# Patient Record
Sex: MALE | Race: WHITE | NOT HISPANIC OR LATINO | Employment: FULL TIME | URBAN - METROPOLITAN AREA
[De-identification: names, ages, dates, MRNs, and addresses within clinical notes are randomized per-mention and may not be internally consistent; named-entity substitution may affect disease eponyms.]

---

## 2017-06-27 ENCOUNTER — OFFICE VISIT (OUTPATIENT)
Dept: URGENT CARE | Facility: PHYSICIAN GROUP | Age: 62
End: 2017-06-27
Payer: COMMERCIAL

## 2017-06-27 VITALS
BODY MASS INDEX: 21.42 KG/M2 | WEIGHT: 153 LBS | HEART RATE: 82 BPM | SYSTOLIC BLOOD PRESSURE: 122 MMHG | OXYGEN SATURATION: 98 % | HEIGHT: 71 IN | RESPIRATION RATE: 12 BRPM | TEMPERATURE: 98.3 F | DIASTOLIC BLOOD PRESSURE: 74 MMHG

## 2017-06-27 PROCEDURE — 99203 OFFICE O/P NEW LOW 30 MIN: CPT | Performed by: PHYSICIAN ASSISTANT

## 2017-06-27 ASSESSMENT — ENCOUNTER SYMPTOMS
HEADACHES: 0
CONSTITUTIONAL NEGATIVE: 1
NAUSEA: 0
MYALGIAS: 0
VOMITING: 0
ROS SKIN COMMENTS: SEE HPI
NEUROLOGICAL NEGATIVE: 1
BRUISES/BLEEDS EASILY: 0
RESPIRATORY NEGATIVE: 1
CARDIOVASCULAR NEGATIVE: 1

## 2017-06-27 ASSESSMENT — PAIN SCALES - GENERAL: PAINLEVEL: 1=MINIMAL PAIN

## 2017-06-27 NOTE — MR AVS SNAPSHOT
"        Junior Bran   2017 9:10 AM   Office Visit   MRN: [id number]    Department:  [facility]   Dept Phone:  [phone]    Description:  Male : 1955   Provider:  Cinthya Titus PA-C           Reason for Visit     Insect Bite x 3 days / RT leg      Allergies as of 2017     No Known Allergies      You were diagnosed with     Hymenoptera sting, accidental or unintentional, initial encounter   [[id number]]         Vital Signs     Blood Pressure Pulse Temperature Respirations Height Weight    122/74 mmHg 82 36.8 °C (98.3 °F) 12 1.803 m (5' 10.98\") 69.4 kg (153 lb)    Body Mass Index Oxygen Saturation                21.35 kg/m2 98%          Basic Information     Date Of Birth Sex Race Ethnicity Preferred Language    1955 Male White Non- English      Health Maintenance        Date Due Completion Dates    IMM DTaP/Tdap/Td Vaccine (1 - Tdap) 1/3/1974 ---    COLONOSCOPY 1/3/2005 ---    IMM ZOSTER VACCINE 1/3/2015 ---            Current Immunizations     No immunizations on file.      Below and/or attached are the medications your provider expects you to take. Review all of your home medications and newly ordered medications with your provider and/or pharmacist. Follow medication instructions as directed by your provider and/or pharmacist. Please keep your medication list with you and share with your provider. Update the information when medications are discontinued, doses are changed, or new medications (including over-the-counter products) are added; and carry medication information at all times in the event of emergency situations     Allergies:  No Known Allergies          Medications  Valid as of: 2017 -  9:38 AM    Generic Name Brand Name Tablet Size Instructions for use    Hydrocodone-Acetaminophen (Tab) NORCO 5-325 MG Take 1-2 Tabs by mouth every 6 hours as needed.        Hydrocortisone Acetate (Suppos) ANUSOL-HC 25 MG Insert 1 Suppository in rectum every 12 hours.       "    .                 Medicines prescribed today were sent to:     [id number] - [place], [place] - [address]    [address] [place] [address]    Phone: [phone] Fax: [phone]    Open 24 Hours?: No    [id number] - [place], [place] - [address]    [address] [place] [address]    Phone: [phone] Fax: [phone]    Open 24 Hours?: No      Medication refill instructions:       If your prescription bottle indicates you have medication refills left, it is not necessary to call your provider’s office. Please contact your pharmacy and they will refill your medication.    If your prescription bottle indicates you do not have any refills left, you may request refills at any time through one of the following ways: The online eSellerPro system (except Urgent Care), by calling your provider’s office, or by asking your pharmacy to contact your provider’s office with a refill request. Medication refills are processed only during regular business hours and may not be available until the next business day. Your provider may request additional information or to have a follow-up visit with you prior to refilling your medication.   *Please Note: Medication refills are assigned a new Rx number when refilled electronically. Your pharmacy may indicate that no refills were authorized even though a new prescription for the same medication is available at the pharmacy. Please request the medicine by name with the pharmacy before contacting your provider for a refill.        Other Notes About Your Plan     Colonoscopy with polyp. Bx report not known. Told to repeat 10 years.           eSellerPro Access Code: [email or web address]  Expires: 7/27/2017  9:38 AM    eSellerPro  A secure, online tool to manage your health information     [employer]’s eSellerPro® is a secure, online tool that connects you to your personalized health information from the privacy of your home -- day or night - making it very easy for you to manage your  healthcare. Once the activation process is completed, you can even access your medical information using the Track lenore, which is available for free in the Apple Lenore store or Google Play store.     Track provides the following levels of access (as shown below):   My Chart Features   [facility] Doctor [facility]  Specialists [facility]  Urgent  Care [facility]  Primary Care  Doctor   Email your healthcare team securely and privately 24/7 X X X    Manage appointments: schedule your next appointment; view details of past/upcoming appointments X      Request prescription refills. X      View recent personal medical records, including lab and immunizations X X X X   View health record, including health history, allergies, medications X X X X   Read reports about your outpatient visits, procedures, consult and ER notes X X X X   See your discharge summary, which is a recap of your hospital and/or ER visit that includes your diagnosis, lab results, and care plan. X X       How to register for Track:  1. Go to  [email or web address].  2. Click on the Sign Up Now box, which takes you to the New Member Sign Up page. You will need to provide the following information:  a. Enter your Track Access Code exactly as it appears at the top of this page. (You will not need to use this code after you’ve completed the sign-up process. If you do not sign up before the expiration date, you must request a new code.)   b. Enter your date of birth.   c. Enter your home email address.   d. Click Submit, and follow the next screen’s instructions.  3. Create a Track ID. This will be your Track login ID and cannot be changed, so think of one that is secure and easy to remember.  4. Create a Track password. You can change your password at any time.  5. Enter your Password Reset Question and Answer. This can be used at a later time if you forget your password.   6. Enter your e-mail address. This allows you to receive e-mail  notifications when new information is available in [employer].  7. Click Sign Up. You can now view your health information.    For assistance activating your [employer] account, call [phone]

## 2017-06-27 NOTE — PROGRESS NOTES
"Subjective:      Junior Bran is a 62 y.o. male who presents with Insect Bite        HPI  Patient presents today for 3 days of suspected insect bite/stings to his right lower leg.   He has a few on his front of his leg and blistering one on his posterior calf.   He states that overall the pain is better as is the itching but the rashes have been slower to improve.  The blister started draining and has had clear fluid.  Wife was concerned for infection and he going out of town on Saturday.   He has not had any fevers, chills or arthralgias.  No SOB, nausea or vomiting.    He has not attempted any interventions.     Review of Systems   Constitutional: Negative.    Respiratory: Negative.    Cardiovascular: Negative.    Gastrointestinal: Negative for nausea and vomiting.   Musculoskeletal: Negative for myalgias and joint pain.   Skin:        SEE HPI   Neurological: Negative.  Negative for headaches.   Endo/Heme/Allergies: Negative for environmental allergies. Does not bruise/bleed easily.       PMH:  has no past medical history on file.  MEDS:   Current outpatient prescriptions:   •  hydrocodone-acetaminophen (NORCO) 5-325 MG TABS per tablet, Take 1-2 Tabs by mouth every 6 hours as needed., Disp: 20 Each, Rfl: 0  •  hydrocortisone (ANUSOL-HC) 25 MG SUPP, Insert 1 Suppository in rectum every 12 hours., Disp: 20 Suppository, Rfl: 0  ALLERGIES: No Known Allergies  SURGHX:   Past Surgical History   Procedure Laterality Date   • Colonoscopy with biopsy       repeat 10 years     SOCHX:  reports that he has never smoked. He does not have any smokeless tobacco history on file. He reports that he drinks about 3.5 oz of alcohol per week.  FH: Family history was reviewed, no pertinent findings to report     Objective:     /74 mmHg  Pulse 82  Temp(Src) 36.8 °C (98.3 °F)  Resp 12  Ht 1.803 m (5' 10.98\")  Wt 69.4 kg (153 lb)  BMI 21.35 kg/m2  SpO2 98%     Physical Exam   Constitutional: He is oriented to person, place, " and time. He appears well-developed and well-nourished. No distress.   Eyes: Conjunctivae and EOM are normal. Pupils are equal, round, and reactive to light.   Neck: Normal range of motion. Neck supple.   Cardiovascular: Normal rate and regular rhythm.    Pulmonary/Chest: Effort normal and breath sounds normal.   Lymphadenopathy:     He has no cervical adenopathy.   Neurological: He is alert and oriented to person, place, and time.   Skin: Skin is warm and dry.        Psychiatric: He has a normal mood and affect. His behavior is normal.   Vitals reviewed.          Assessment/Plan:     1. Hymenoptera sting, accidental or unintentional, initial encounter    -discussed with patient most hymenoptera bites/stings are aseptic and secondary infection usually progresses from scratching and self inoculation.  Patient admits he really has not been itching.  Localized allergic dermatitis dx today.   -he feels well and feels that overall improving but wanted to make sure no infection.   -discussed the signs and symptoms of this.   -recommend Benadryl and offered rx topical corticosteroid that he declines.   -he will RTC immediately at any worsening condition or signs of infection.       Supportive care, differential diagnoses, and indications for immediate follow-up discussed with patient.   Pathogenesis of diagnosis discussed including typical length and natural progression.   Instructed to return to clinic or nearest emergency department for any change in condition, further concerns, or worsening of symptoms.  Patient states understanding of the plan of care and discharge instructions.        Cinthya Titus PA-C

## 2017-09-03 ENCOUNTER — HOSPITAL ENCOUNTER (EMERGENCY)
Facility: MEDICAL CENTER | Age: 62
End: 2017-09-03
Payer: COMMERCIAL

## 2017-09-03 VITALS
RESPIRATION RATE: 18 BRPM | BODY MASS INDEX: 20.56 KG/M2 | HEIGHT: 71 IN | SYSTOLIC BLOOD PRESSURE: 109 MMHG | TEMPERATURE: 98 F | DIASTOLIC BLOOD PRESSURE: 68 MMHG | WEIGHT: 146.83 LBS | HEART RATE: 104 BPM | OXYGEN SATURATION: 98 %

## 2017-09-03 LAB — EKG IMPRESSION: NORMAL

## 2017-09-03 PROCEDURE — 302449 STATCHG TRIAGE ONLY (STATISTIC)

## 2017-09-03 PROCEDURE — 93005 ELECTROCARDIOGRAM TRACING: CPT

## 2017-09-03 ASSESSMENT — PAIN SCALES - GENERAL: PAINLEVEL_OUTOF10: 2

## 2017-09-03 NOTE — ED NOTES
"Chief Complaint   Patient presents with   • Epigastric Pain     Symptoms started approx 10pm yesterday. Reports pain as sharp and constant initially. Now pt states his pain has decreased, is intermittent, and is worse with inspiration.     /68   Pulse (!) 104   Temp 36.7 °C (98 °F)   Resp 18   Ht 1.803 m (5' 11\")   Wt 66.6 kg (146 lb 13.2 oz)   SpO2 98%   BMI 20.48 kg/m²     Pt ambulated into triage, complaints as above. Denies cardiac hx. EKG completed. VS as above, NAD, encouraged to return to the triage nurse or tech with any new complaints or symptoms.  "

## 2017-09-15 ENCOUNTER — OFFICE VISIT (OUTPATIENT)
Dept: URGENT CARE | Facility: PHYSICIAN GROUP | Age: 62
End: 2017-09-15
Payer: COMMERCIAL

## 2017-09-15 VITALS
DIASTOLIC BLOOD PRESSURE: 72 MMHG | BODY MASS INDEX: 20.58 KG/M2 | SYSTOLIC BLOOD PRESSURE: 116 MMHG | HEART RATE: 60 BPM | WEIGHT: 147 LBS | TEMPERATURE: 97.7 F | HEIGHT: 71 IN | OXYGEN SATURATION: 96 %

## 2017-09-15 DIAGNOSIS — J01.00 ACUTE NON-RECURRENT MAXILLARY SINUSITIS: ICD-10-CM

## 2017-09-15 PROCEDURE — 99214 OFFICE O/P EST MOD 30 MIN: CPT | Performed by: PHYSICIAN ASSISTANT

## 2017-09-15 RX ORDER — AMOXICILLIN AND CLAVULANATE POTASSIUM 875; 125 MG/1; MG/1
1 TABLET, FILM COATED ORAL 2 TIMES DAILY
Qty: 20 TAB | Refills: 0 | Status: SHIPPED | OUTPATIENT
Start: 2017-09-15 | End: 2018-09-28

## 2017-09-15 NOTE — PROGRESS NOTES
"Chief Complaint   Patient presents with   • Headache     Sinus headache, pressure and pain x 3 wks. Comes and goes        HISTORY OF PRESENT ILLNESS: Patient is a 62 y.o. male who presents today for 3 weeks of waxing and waning sinus congestion and pressure.    He has been going back and forth to [place] and cleaned out a house, may have come in contact with dust.  He has had runny nose, nasal congestion.   He states that his sinus pressure is on both sides of his face.  He has been using Claritin and Flonase intermittently but not consistently.  Will make up with throbbing in sinuses in the morning/recumbant position.     There are no active problems to display for this patient.      Allergies:Review of patient's allergies indicates no known allergies.    No current [facility]-ordered outpatient prescriptions on file.     No current [facility]-ordered facility-administered medications on file.        No past medical history on file.    Social History   Substance Use Topics   • Smoking status: Never Smoker   • Smokeless tobacco: Never Used   • Alcohol use 3.5 oz/week     7 Glasses of wine per week       No family status information on file.   No family history on file. No pertinent FH    ROS:  Review of Systems   Constitutional: SEE HPI  HENT: SEE HPI  Eyes: Negative for blurred vision.   Respiratory: SEE HPI  Cardiovascular: Negative for chest pain, palpitations, orthopnea and leg swelling.   Gastrointestinal: Negative for heartburn, nausea, vomiting and abdominal pain.   All other systems reviewed and are negative.       Exam:  Blood pressure 116/72, pulse 60, temperature 36.5 °C (97.7 °F), height 1.803 m (5' 11\"), weight 66.7 kg (147 lb), SpO2 96 %.  General:  Well nourished, well developed male in NAD  Eyes: PERRLA, EOM within normal limits, no conjunctival injection, no scleral icterus, visual fields and acuity grossly intact.  Ears: Normal shape and symmetry, no tenderness, no discharge. External canals are without any " significant edema or erythema. Tympanic membranes are without any inflammation, no effusion. Gross auditory acuity is intact  Nose: Symmetrical, sinuses without tenderness, no discharge/clear rhinorrhea.    Mouth: reasonable hygiene, no erythema exudates or tonsillar enlargement.  Neck: no masses, range of motion within normal limits, no tracheal deviation. No lymphadenopathy  Pulmonary: Normal respiratory effort, no wheezes, crackles, or rhonchi.  Cardiovascular: regular rate and rhythm without murmurs, rubs, or gallops.  Skin: No visible rashes or lesion. Warm, pink, dry.   Extremities: no clubbing, cyanosis, or edema.  Neuro: A&O x 3. Speech normal/clear.  Normal gait.         Assessment/Plan:  1. Acute non-recurrent maxillary sinusitis  amoxicillin-clavulanate (AUGMENTIN) 875-125 MG Tab          -consistent with acute sinusitis.   Sinus care at home discussed  -fluids, rest emphasized.  Flonase/Allegra or similar for post nasal drainage trial first, consistently  -humidifier/steam inhalations to soothe lungs.   -rx as above. Contingent antibiotic prescription given to patient to fill upon meeting criteria of guidelines discussed.       Supportive care, differential diagnoses, and indications for immediate follow-up discussed with patient.   Pathogenesis of diagnosis discussed including typical length and natural progression.   Instructed to return to clinic or nearest emergency department for any change in condition, further concerns, or worsening of symptoms.  Patient states understanding of the plan of care and discharge instructions.      Cinthya Titus P.A.-C.

## 2018-06-13 DIAGNOSIS — Z00.00 WELL ADULT EXAM: ICD-10-CM

## 2018-09-14 ENCOUNTER — HOSPITAL ENCOUNTER (OUTPATIENT)
Dept: RADIOLOGY | Facility: MEDICAL CENTER | Age: 63
End: 2018-09-14
Attending: FAMILY MEDICINE
Payer: COMMERCIAL

## 2018-09-14 ENCOUNTER — NON-PROVIDER VISIT (OUTPATIENT)
Dept: OTHER | Facility: MEDICAL CENTER | Age: 63
End: 2018-09-14

## 2018-09-14 ENCOUNTER — HOSPITAL ENCOUNTER (OUTPATIENT)
Facility: MEDICAL CENTER | Age: 63
End: 2018-09-14
Attending: FAMILY MEDICINE
Payer: COMMERCIAL

## 2018-09-14 DIAGNOSIS — Z00.00 PHYSICAL EXAM, ROUTINE: ICD-10-CM

## 2018-09-14 DIAGNOSIS — Z00.00 WELL ADULT EXAM: ICD-10-CM

## 2018-09-14 LAB
ALBUMIN SERPL BCP-MCNC: 4.6 G/DL (ref 3.2–4.9)
ALBUMIN/GLOB SERPL: 1.5 G/DL
ALP SERPL-CCNC: 62 U/L (ref 30–99)
ALT SERPL-CCNC: 19 U/L (ref 2–50)
ANION GAP SERPL CALC-SCNC: 8 MMOL/L (ref 0–11.9)
APPEARANCE UR: CLEAR
AST SERPL-CCNC: 21 U/L (ref 12–45)
BASOPHILS # BLD AUTO: 0.8 % (ref 0–1.8)
BASOPHILS # BLD: 0.04 K/UL (ref 0–0.12)
BILIRUB SERPL-MCNC: 0.7 MG/DL (ref 0.1–1.5)
BILIRUB UR QL STRIP.AUTO: NEGATIVE
BUN SERPL-MCNC: 15 MG/DL (ref 8–22)
CALCIUM SERPL-MCNC: 8.8 MG/DL (ref 8.5–10.5)
CHLORIDE SERPL-SCNC: 103 MMOL/L (ref 96–112)
CO2 SERPL-SCNC: 26 MMOL/L (ref 20–33)
COLOR UR: YELLOW
CREAT SERPL-MCNC: 0.79 MG/DL (ref 0.5–1.4)
CREAT UR-MCNC: 190.3 MG/DL
CRP SERPL HS-MCNC: 0.8 MG/L (ref 0–7.5)
EOSINOPHIL # BLD AUTO: 0.22 K/UL (ref 0–0.51)
EOSINOPHIL NFR BLD: 4.3 % (ref 0–6.9)
ERYTHROCYTE [DISTWIDTH] IN BLOOD BY AUTOMATED COUNT: 42.3 FL (ref 35.9–50)
EST. AVERAGE GLUCOSE BLD GHB EST-MCNC: 111 MG/DL
GLOBULIN SER CALC-MCNC: 3 G/DL (ref 1.9–3.5)
GLUCOSE SERPL-MCNC: 95 MG/DL (ref 65–99)
GLUCOSE UR STRIP.AUTO-MCNC: NEGATIVE MG/DL
HBA1C MFR BLD: 5.5 % (ref 0–5.6)
HCT VFR BLD AUTO: 45.5 % (ref 42–52)
HGB BLD-MCNC: 15 G/DL (ref 14–18)
IMM GRANULOCYTES # BLD AUTO: 0.02 K/UL (ref 0–0.11)
IMM GRANULOCYTES NFR BLD AUTO: 0.4 % (ref 0–0.9)
KETONES UR STRIP.AUTO-MCNC: 15 MG/DL
LEUKOCYTE ESTERASE UR QL STRIP.AUTO: NEGATIVE
LYMPHOCYTES # BLD AUTO: 1.85 K/UL (ref 1–4.8)
LYMPHOCYTES NFR BLD: 36.5 % (ref 22–41)
MCH RBC QN AUTO: 30.3 PG (ref 27–33)
MCHC RBC AUTO-ENTMCNC: 33 G/DL (ref 33.7–35.3)
MCV RBC AUTO: 91.9 FL (ref 81.4–97.8)
MICRO URNS: ABNORMAL
MICROALBUMIN UR-MCNC: 1.2 MG/DL
MICROALBUMIN/CREAT UR: 6 MG/G (ref 0–30)
MONOCYTES # BLD AUTO: 0.41 K/UL (ref 0–0.85)
MONOCYTES NFR BLD AUTO: 8.1 % (ref 0–13.4)
NEUTROPHILS # BLD AUTO: 2.53 K/UL (ref 1.82–7.42)
NEUTROPHILS NFR BLD: 49.9 % (ref 44–72)
NITRITE UR QL STRIP.AUTO: NEGATIVE
NRBC # BLD AUTO: 0 K/UL
NRBC BLD-RTO: 0 /100 WBC
PH UR STRIP.AUTO: 5.5 [PH]
PLATELET # BLD AUTO: 321 K/UL (ref 164–446)
PMV BLD AUTO: 10.6 FL (ref 9–12.9)
POTASSIUM SERPL-SCNC: 3.9 MMOL/L (ref 3.6–5.5)
PROT SERPL-MCNC: 7.6 G/DL (ref 6–8.2)
PROT UR QL STRIP: NEGATIVE MG/DL
PSA SERPL-MCNC: 2.48 NG/ML (ref 0–4)
RBC # BLD AUTO: 4.95 M/UL (ref 4.7–6.1)
RBC UR QL AUTO: NEGATIVE
SODIUM SERPL-SCNC: 137 MMOL/L (ref 135–145)
SP GR UR STRIP.AUTO: 1.03
T4 FREE SERPL-MCNC: 0.72 NG/DL (ref 0.53–1.43)
TSH SERPL DL<=0.005 MIU/L-ACNC: 2.71 UIU/ML (ref 0.38–5.33)
UROBILINOGEN UR STRIP.AUTO-MCNC: 0.2 MG/DL
WBC # BLD AUTO: 5.1 K/UL (ref 4.8–10.8)

## 2018-09-14 PROCEDURE — 4410556 CT-CARDIAC SCORING

## 2018-09-14 PROCEDURE — 306723 HCHG VASCULAR SCREENING

## 2018-09-14 PROCEDURE — 71046 X-RAY EXAM CHEST 2 VIEWS: CPT

## 2018-09-14 PROCEDURE — 76700 US EXAM ABDOM COMPLETE: CPT

## 2018-09-14 PROCEDURE — 306734 HCHG ADVANCED VASCULAR SCREENING

## 2018-09-15 LAB
25(OH)D3 SERPL-MCNC: 28 NG/ML (ref 30–100)
HCV AB SER QL: NEGATIVE

## 2018-09-18 LAB
LPA SERPL-MCNC: 3 MG/DL
SHBG SERPL-SCNC: 70 NMOL/L (ref 11–80)
TESTOST FREE MFR SERPL: 1.2 % (ref 1.6–2.9)
TESTOST FREE SERPL-MCNC: 71 PG/ML (ref 47–244)
TESTOST SERPL-MCNC: 598 NG/DL (ref 300–720)

## 2018-09-19 LAB
CHOLEST SERPL-MCNC: 207 MG/DL
HDL PARTICAL NO Q4363: >41 UMOL/L
HDL SIZE Q4361: 9.1 NM
HDLC SERPL-MCNC: 73 MG/DL (ref 40–59)
HLD.LARGE SERPL-SCNC: 8.7 UMOL/L
L VLDL PART NO Q4357: 1.6 NMOL/L
LDL SERPL QN: 21.1 NM
LDL SERPL-SCNC: 1205 NMOL/L
LDL SMALL SERPL-SCNC: 374 NMOL/L
LDLC SERPL CALC-MCNC: 117 MG/DL
PATHOLOGY STUDY: ABNORMAL
TRIGL SERPL-MCNC: 85 MG/DL (ref 30–149)
VLDL SIZE Q4362: 47.2 NM

## 2018-09-28 ENCOUNTER — APPOINTMENT (OUTPATIENT)
Dept: OTHER | Facility: MEDICAL CENTER | Age: 63
End: 2018-09-28

## 2018-09-28 ENCOUNTER — OFFICE VISIT (OUTPATIENT)
Dept: OTHER | Facility: MEDICAL CENTER | Age: 63
End: 2018-09-28

## 2018-09-28 VITALS
TEMPERATURE: 99.2 F | WEIGHT: 157 LBS | BODY MASS INDEX: 21.98 KG/M2 | HEART RATE: 64 BPM | RESPIRATION RATE: 12 BRPM | OXYGEN SATURATION: 96 % | HEIGHT: 71 IN | DIASTOLIC BLOOD PRESSURE: 62 MMHG | SYSTOLIC BLOOD PRESSURE: 110 MMHG

## 2018-09-28 DIAGNOSIS — E55.9 VITAMIN D DEFICIENCY: ICD-10-CM

## 2018-09-28 DIAGNOSIS — L81.9 PIGMENTED SKIN LESION OF UNCERTAIN NATURE: ICD-10-CM

## 2018-09-28 DIAGNOSIS — L57.0 ACTINIC KERATOSES: ICD-10-CM

## 2018-09-28 DIAGNOSIS — Z28.39 BEHIND ON IMMUNIZATIONS: ICD-10-CM

## 2018-09-28 DIAGNOSIS — I77.810 ASCENDING AORTA DILATATION (HCC): ICD-10-CM

## 2018-09-28 DIAGNOSIS — E78.5 DYSLIPIDEMIA: ICD-10-CM

## 2018-09-28 DIAGNOSIS — Z00.00 WELL ADULT EXAM: Primary | ICD-10-CM

## 2018-09-28 DIAGNOSIS — D17.1 LIPOMA OF TORSO: ICD-10-CM

## 2018-09-28 DIAGNOSIS — I51.7 LVH (LEFT VENTRICULAR HYPERTROPHY): ICD-10-CM

## 2018-09-28 DIAGNOSIS — Z02.82 ADOPTED PERSON: ICD-10-CM

## 2018-09-28 DIAGNOSIS — R93.89 ABNORMAL FINDING ON CHEST XRAY: ICD-10-CM

## 2018-09-28 DIAGNOSIS — R97.20 RISING PSA LEVEL: ICD-10-CM

## 2018-09-28 NOTE — LETTER
"September 29, 2018        [id number]  [facility] [address]        Dear Junior:    Thank you for participating in [facility].  I enjoyed meeting you today and performing your Executive History and Physical examination.  We covered a great deal of information, and I have summarized my Assessment and Plan below.  Please carefully review all the information in this packet.  I do suggest you schedule an appointment with a Primary Care Provider soon to review the results of your examination and develop a plan moving forward.    Overall, I consider you to be in very good health.  I am particularly pleased with your normal weight, and good exercise tolerance.  We did, however, identify some issues that require further attention.  In terms of Cardio-metabolic related issues, you have the following: abnormal electrocardiogram (i.e. Left ventricular hypertrophy by voltage criteria), abnormal appearance of left heart border on chest x-ray, dyslipidemia, Vitamin D deficiency, and mild dilatation of ascending aorta.  The best treatment for most of these conditions is Therapeutic Lifestyle Changes.  Specifically, I strongly recommend eating \"real food, mostly plants, not too much\", daily exercise, striving for a normal weight/BMI, active stress management, 7-8 hours of quality sleep per night, a loving social environment, and an altruistic philosophy.  In addition, I do recommend you have an ECHOCARDIOGRAM to obtain a more thorough evaluation of your heart, and a CT Scan of your chest to get a better evaluation of the 2 lung nodules that we discovered on Imaging today. I do recommend Vitamin D3 2000 IU/day. In terms of your skin lesions, I recommend destruction of the irritated R ear lesion, decision by your Primary Care Provider regarding biopsy of left scapular area, and close observation of the large lipoma-like lesion of your left lower back. I also recommend the following: Recheck PSA " in one year, and I recommend the following vaccines: Annual FLU, one-time Tdap, and one-time Shingrix 2-vaccine series. I recommend COLONOSCOPY soon (he is 3 years overdue.) If you have any questions or concerns, please don't hesitate to call.        Sincerely,        Gera Adams M.D.    ASSESSMENT:    Encounter Diagnoses   Name Primary?   • Executive History and Physical Examination Yes   • Adopted person    • ECG voltage criteria for LVH (left ventricular hypertrophy)    • Abnormal findings on chest xray/CT (Irregular Left Ventricular Border, Right Lung Nodule, Right Middle Lobe nodule    • Dyslipidemia    • Rising PSA level    • Vitamin D deficiency    • Behind on immunizations & colonoscopy    • Ascending aorta dilatation (HCC), mild    • Actinic keratoses, including R pinna    • Lipoma of torso    • Pigmented skin lesion of uncertain nature, left scapula area        PLAN:    Total Face-to-Face time spent with patient: 75 minutes  Amount of time spent counseling patient and/or coordinating care: 40 minutes    The nature of patient counseling as below:  -Patient Education  -Differential Diagnoses and treatment options discussed  -Risks, benefits, alternatives discussed  -Labs reviewed with patient in detail  -Imaging/ETT/Spirometry/vision/hearing reports reviewed with patient in detail  -Health Maintenance Exam issues discussed  -Exercise  -Dietary recommendations discussed (whole-food plant based diet)  -Therapeutic Lifestyle Changes discussed    The nature of coordination of care as below:  -Medications added: Final decisions regarding medications to be made between patient and Primary Care Provider. In light of low Vitamin D level, I recommend Vitamin D3 2000 IU/day.   -Medications discontinued: none  -Medications adjusted: none  -Continue present chronic medications  -Referrals: Patient does not presently have a Primary Care Provider. I recommend patient schedule appointment with Primary Care Provider soon  to review results of today's examination and develop a plan moving forward.  -Other: I recommend destruction of R ear skin lesion, and decision regarding excisional biopsy of left scapular pigmented lesion. Observation of lipoma-like chronic lump of back.    I recommend Primary Care Provider order Echocardiogram in light of LVH voltage criteria on ECG, irregular Left Ventricular border on chest-xray, & mildly dilated ascending aorta on CTCS,   -Seek medical attention immediately if worse   Recheck PSA in one year    I recommend the following vaccines: Annual FLU, one-time Tdap, and one-time Shingrix 2-vaccine series.    I recommend COLONOSCOPY soon (he is 3 years overdue.)     FOLLOW-UP:  -With Primary Care Provider soon

## 2018-09-28 NOTE — PROGRESS NOTES
[facility]    EXECUTIVE HISTORY AND PHYSICAL  Performed by Dr. Gera Adams    SUBJECTIVE:    Chief Complaint   Patient presents with   • Executive Physical   • Other     LVH   • Other     Imaging abnormalities   • Hyperlipidemia   • Skin Lesion     multiple   • Immunizations     Due for several   • Abnormal Labs   • Vitamin D Deficiency       Junior Bran is a 63 y.o. male,   New Patient @ [facility]    Preventive medicine issues discussed:  abuse, aspirin, dental, Alcohol, Tobacco, HIV/AIDS, injuries, mental health/depression, nutrition, exercise, occupational health, sexual behavior, UV exposure, advanced directives, Cancer Screening. Vaccines.      PROBLEM #1-HISTORY OF PRESENT ILLNESS  Existing Problem  PATIENT STATEMENT OF PROBLEM - Cardiometabolic related issues  ONSET - discussed today  COURSE - Asymptomatic. No ASCVD event history.  Adopted. LVH by voltage criteria on ECG.  Normal body weight/BMI. CIMT: 45 v 63. CTCS: 0. No imaging evidence of atherosclerosis. Mild ascending aorta dilatation. Irregular LV border on Dx Chest. Current pertinent labs:   HIGH & INT RISK: Tchol/LDL/LDL-P/VLDL-S/UACR(6)/D(28).    Counseled.   INTENSITY/STATUS/LOCATION/RADIATION - variable/ subclinical/ as above  AGGRAVATORS - could improve nutrition  RELIEVERS - good exercise  TREATMENTS/COMPLIANCE/@GOAL? - same/ some inadequate Therapeutic Lifestyle Changes/ no     PROBLEM #2-HISTORY OF PRESENT ILLNESS  New Problem  PATIENT STATEMENT OF PROBLEM - Imaging abnormalities  ONSET - identified today  COURSE - RML 4 mm nodule. R lung nodule. Irregular contour of LV border. Counseled.     PROBLEM #3-HISTORY OF PRESENT ILLNESS  PATIENT STATEMENT OF PROBLEM - Skin issues   ONSET - ?  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 No obvious change in size. 3x3 mm dark red rounded slightly elevated pigmented lesion L scapula area.  Numerous sun damage areas. Irritated raised AK  like lesion right pinna (crura of antihelix area).   INTENSITY/STATUS/LOCATION/RADIATION - as above/ present/ as above  AGGRAVATORS - Multifactorial including sun damage  RELIEVERS - none  TREATMENTS/COMPLIANCE/@GOAL? - none/ na/ no     PROBLEM #4-HISTORY OF PRESENT ILLNESS  New Problem  PATIENT STATEMENT OF PROBLEM - Vaccines & Health Maintenance issues  ONSET - discussed today  COURSE - He is due for Annual FLU vaccine, one-time Tdap, and Shingrix series. Also, he is 3 years overdue for Colonoscopy. Counseled.     PROBLEM #5-HISTORY OF PRESENT ILLNESS  New Problem  PATIENT STATEMENT OF PROBLEM - PSA  ONSET - discussed today  COURSE - Normal VIVIEN. PSA in 2010 was 0.8, and is 2.48 today. Counseled.     No Known Allergies    Patient Active Problem List    Diagnosis Date Noted   • Executive History and Physical Examination 09/29/2018   • Adopted person 09/29/2018   • ECG voltage criteria for LVH (left ventricular hypertrophy) 09/29/2018   • Abnormal findings on chest xray/CT (Irregular Left Ventricular Border, Right Lung Nodule, Right Middle Lobe nodule 09/29/2018   • Dyslipidemia 09/29/2018   • Rising PSA level 09/29/2018   • Vitamin D deficiency 09/29/2018   • Behind on immunizations & colonoscopy 09/29/2018   • Ascending aorta dilatation (HCC), mild 09/29/2018   • Actinic keratoses, including R pinna 09/29/2018   • Lipoma of torso 09/29/2018   • Pigmented skin lesion of uncertain nature, left scapula area 09/29/2018       No current outpatient prescriptions on file prior to visit.     No current facility-administered medications on file prior to visit.        Social History     Social History   • Marital status: Single     Spouse name: N/A   • Number of children: N/A   • Years of education: N/A     Occupational History   • Not on file.     Social History Main Topics   • Smoking status: Never Smoker   • Smokeless tobacco: Never Used   • Alcohol use 3.5 oz/week     7 Glasses of wine per week   • Drug use: No   •  "Sexual activity: Yes     Partners: Female     Other Topics Concern   • Not on file     Social History Narrative   • No narrative on file       Family History   Problem Relation Age of Onset   • Adopted: Yes       Patient's Past, Social, and Family History reviewed     REVIEW OF SYMPTOMS:               Pertinent Positives as above.    All other systems reviewed and negative.    OBJECTIVE:    /62 (BP Location: Left arm, Patient Position: Sitting, BP Cuff Size: Adult)   Pulse 64   Temp 37.3 °C (99.2 °F) (Temporal)   Resp 12   Ht 1.803 m (5' 11\")   Wt 71.2 kg (157 lb)   SpO2 96%   BMI 21.90 kg/m²   Body mass index is 21.9 kg/m².    Well developed, well nourished male, no acute distress, non-ill appearing. Comfortable, appears stated age, pleasant and cooperative, Alert and Oriented x 3.   HEAD: atraumatic, normocephalic   EYES: Conjunctiva normal, EOMI, PERRLA, acuity grossly intact.   EARS/NOSE/THROAT: TM's normal, no SSX of infection, no perforation, no hemotympanum, acuity grossly intact. Oropharynx: benign, no lesions noted. Nares: benign.   NECK: supple, no adenopathy, no thyromegaly or nodules, no JVD, no carotid bruits.   CHEST/LUNGS: clear to auscultation and percussion bilaterally. No adventitious breath sounds.   CARDIOVASCULAR: regular rate and rhythm, no murmur. PMI not displaced. Good central and peripheral pulses.   BACK: no CVA tenderness.   ABDOMEN: soft, non-tender, non-distended, no masses, no hepatosplenomegaly. Normal active bowel tones.   : deferred.   Rectal: prostate normal. No rectal abnormalities palpated. Extremities: warm/well-perfused, no cyanosis, clubbing, or edema.   SKIN: clear, unbroken, no rashes, normal turgor. Irritated raised AK like lesion right pinna (crura of antihelix area).  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 CN II-XII grossly intact. Gait normal. Non-focal, intact. Normal strength, sensation    EXERCISE STRESS TEST REPORT:    Interpreted by me    INTERPRETATION:  Voltage criteria for LVH on resting ECG. Patient achieved 97% of maximum predicted heart rate with physiological response in blood pressure and no associated ST segment depression.   Also, specifically no associated ST elevation, no significant ventricular extrasystoles, no ventricular tachycardia, no new atrial fibrillation or supraventricular tachycardia, and  no new heart blocks.  Patient denied chest pain, severe dyspnea, dizziness, or ataxia.     CONCLUSION:  Voltage criteria for LVH on resting ECG. Normal Exercise Stress Test indicating low probability of flow-limiting coronary artery disease.     ASSESSMENT:    Encounter Diagnoses   Name Primary?   • Executive History and Physical Examination Yes   • Adopted person    • ECG voltage criteria for LVH (left ventricular hypertrophy)    • Abnormal findings on chest xray/CT (Irregular Left Ventricular Border, Right Lung Nodule, Right Middle Lobe nodule    • Dyslipidemia    • Rising PSA level    • Vitamin D deficiency    • Behind on immunizations & colonoscopy    • Ascending aorta dilatation (HCC), mild    • Actinic keratoses, including R pinna    • Lipoma of torso    • Pigmented skin lesion of uncertain nature, left scapula area        PLAN:    Total Face-to-Face time spent with patient: 75 minutes  Amount of time spent counseling patient and/or coordinating care: 40 minutes    The nature of patient counseling as below:  -Patient Education  -Differential Diagnoses and treatment options discussed  -Risks, benefits, alternatives discussed  -Labs reviewed with patient in detail  -Imaging/ETT/Spirometry/vision/hearing reports reviewed with patient in detail  -Health Maintenance Exam issues discussed  -Exercise  -Dietary recommendations discussed (whole-food plant based  diet)  -Therapeutic Lifestyle Changes discussed    The nature of coordination of care as below:  -Medications added: Final decisions regarding medications to be made between patient and Primary Care Provider. In light of low Vitamin D level, I recommend Vitamin D3 2000 IU/day.   -Medications discontinued: none  -Medications adjusted: none  -Continue present chronic medications  -Referrals: Patient does not presently have a Primary Care Provider. I recommend patient schedule appointment with Primary Care Provider soon to review results of today's examination and develop a plan moving forward.  -Other: I recommend destruction of R ear skin lesion, and decision regarding excisional biopsy of left scapular pigmented lesion. Observation of lipoma-like chronic lump of back.    I recommend Primary Care Provider order Echocardiogram in light of LVH voltage criteria on ECG, irregular Left Ventricular border on chest-xray, & mildly dilated ascending aorta on CTCS,   -Seek medical attention immediately if worse   Recheck PSA in one year    I recommend the following vaccines: Annual FLU, one-time Tdap, and one-time Shingrix 2-vaccine series.    I recommend COLONOSCOPY soon (he is 3 years overdue.)     FOLLOW-UP:  -With Primary Care Provider soon

## 2018-09-29 PROBLEM — I77.810 ASCENDING AORTA DILATATION (HCC): Status: ACTIVE | Noted: 2018-09-29

## 2018-09-29 PROBLEM — I51.7 LVH (LEFT VENTRICULAR HYPERTROPHY): Status: ACTIVE | Noted: 2018-09-29

## 2018-09-29 PROBLEM — L57.0 ACTINIC KERATOSES: Status: ACTIVE | Noted: 2018-09-29

## 2018-09-29 PROBLEM — D17.1 LIPOMA OF TORSO: Status: ACTIVE | Noted: 2018-09-29

## 2018-09-29 PROBLEM — Z02.82 ADOPTED PERSON: Status: ACTIVE | Noted: 2018-09-29

## 2018-09-29 PROBLEM — Z00.00 WELL ADULT EXAM: Status: ACTIVE | Noted: 2018-09-29

## 2018-09-29 PROBLEM — R93.89 ABNORMAL FINDING ON CHEST XRAY: Status: ACTIVE | Noted: 2018-09-29

## 2018-09-29 PROBLEM — L81.9 PIGMENTED SKIN LESION OF UNCERTAIN NATURE: Status: ACTIVE | Noted: 2018-09-29

## 2018-09-29 PROBLEM — Z28.39 BEHIND ON IMMUNIZATIONS: Status: ACTIVE | Noted: 2018-09-29

## 2018-09-29 PROBLEM — R97.20 RISING PSA LEVEL: Status: ACTIVE | Noted: 2018-09-29

## 2018-09-29 PROBLEM — E78.5 DYSLIPIDEMIA: Status: ACTIVE | Noted: 2018-09-29

## 2018-09-29 PROBLEM — E55.9 VITAMIN D DEFICIENCY: Status: ACTIVE | Noted: 2018-09-29

## 2018-09-29 NOTE — PATIENT INSTRUCTIONS
No current [facility]-ordered outpatient prescriptions on file.     No current [facility]-ordered facility-administered medications on file.

## 2018-10-16 ENCOUNTER — OFFICE VISIT (OUTPATIENT)
Dept: INTERNAL MEDICINE | Facility: MEDICAL CENTER | Age: 63
End: 2018-10-16
Payer: COMMERCIAL

## 2018-10-16 VITALS
WEIGHT: 155.6 LBS | HEIGHT: 71 IN | DIASTOLIC BLOOD PRESSURE: 64 MMHG | SYSTOLIC BLOOD PRESSURE: 102 MMHG | BODY MASS INDEX: 21.78 KG/M2 | RESPIRATION RATE: 20 BRPM | OXYGEN SATURATION: 98 % | TEMPERATURE: 97.5 F | HEART RATE: 64 BPM

## 2018-10-16 DIAGNOSIS — Z76.89 ENCOUNTER TO ESTABLISH CARE: ICD-10-CM

## 2018-10-16 DIAGNOSIS — Z23 NEED FOR VACCINATION: ICD-10-CM

## 2018-10-16 DIAGNOSIS — E55.9 VITAMIN D INSUFFICIENCY: ICD-10-CM

## 2018-10-16 DIAGNOSIS — R91.1 LUNG NODULE: ICD-10-CM

## 2018-10-16 PROCEDURE — 90471 IMMUNIZATION ADMIN: CPT | Performed by: INTERNAL MEDICINE

## 2018-10-16 PROCEDURE — 99204 OFFICE O/P NEW MOD 45 MIN: CPT | Mod: GC,25 | Performed by: INTERNAL MEDICINE

## 2018-10-16 PROCEDURE — 90686 IIV4 VACC NO PRSV 0.5 ML IM: CPT | Performed by: INTERNAL MEDICINE

## 2018-10-16 PROCEDURE — 90472 IMMUNIZATION ADMIN EACH ADD: CPT | Performed by: INTERNAL MEDICINE

## 2018-10-16 PROCEDURE — 90715 TDAP VACCINE 7 YRS/> IM: CPT | Performed by: INTERNAL MEDICINE

## 2018-10-16 NOTE — PATIENT INSTRUCTIONS
- please schedule the chest CT scan   - please follow up in 6 months     Pulmonary Nodule  A pulmonary nodule is a small, round spot in your lung. It is usually found when pictures of your lungs are taken for other reasons. Most pulmonary nodules are not cancerous and do not cause symptoms. Tests will be done to make sure the nodule is not cancerous. Pulmonary nodules that are not cancerous usually do not require treatment.  Follow these instructions at home:  · Only take medicine as told by your doctor.  · Follow up with your doctor as told.  Contact a doctor if:  · You have trouble breathing when doing activities.  · You feel sick or more tired than normal.  · You do not feel like eating.  · You lose weight without trying to.  · You have chills.  · You have night sweats.  Get help right away if:  · You cannot catch your breath.  · You start making whistling sounds when breathing (wheezing).  · You have a cough that does not go away.  · You cough up blood.  · You are dizzy or feel like you are going to pass out.  · You have sudden chest pain.  · You have a fever or lasting symptoms for more than 2-3 days.  · You have a fever and your symptoms suddenly get worse.  This information is not intended to replace advice given to you by your health care provider. Make sure you discuss any questions you have with your health care provider.  Document Released: 01/20/2012 Document Revised: 05/25/2017 Document Reviewed: 06/09/2014  [employer] Interactive Patient Education © 2017 [employer]

## 2018-10-16 NOTE — NON-PROVIDER
"Junior Bran is a 63 y.o. male here for a non-provider visit for:   FLU    Reason for immunization: Annual Flu Vaccine  Immunization records indicate need for vaccine: Yes, confirmed with Epic and confirmed with [place] [employer]  Minimum interval has been met for this vaccine: Yes  ABN completed: Not Indicated    Order and dose verified by: Holden   VIS Dated  8/7/15 was given to patient: Yes  All IAC Questionnaire questions were answered \"No.\"    Patient tolerated injection and no adverse effects were observed or reported: Yes    Pt scheduled for next dose in series: Not Indicated    Junior Bran is a 63 y.o. male here for a non-provider visit for:   TDAP    Reason for immunization: Annual Flu Vaccine  Immunization records indicate need for vaccine: Yes, confirmed with Epic  Minimum interval has been met for this vaccine: Yes  ABN completed: Not Indicated    Order and dose verified by: Holden   VIS Dated  2/24/15 was given to patient: Yes  All IAC Questionnaire questions were answered \"No.\"    Patient tolerated injection and no adverse effects were observed or reported: Yes    Pt scheduled for next dose in series: Not Indicated      "

## 2018-10-16 NOTE — PROGRESS NOTES
New Patient to Establish    Reason to establish: New patient to establish    CC: New patient establishment.    HPI: Junior Bran is a 63 y.o. male with no significant past medical history presented to the clinic to establish As a New Patient, Recently Patient Had an Executive Physical Exam.    Vitamin D insufficiency: Lab work done in September 2018 showed vitamin D of 28, patient currently taking over-the-counter vitamin D 2000 international units daily.    Lung nodule: Chest x-ray done in September 2018 showed right upper lungs on intermediate nodule, patient denies cough, hemoptysis, shortness of breath and weight loss.    Need for vaccine: Patient needs to get flu and Tdap vaccine        Patient Active Problem List    Diagnosis Date Noted   • Executive History and Physical Examination 09/29/2018   • Adopted person 09/29/2018   • ECG voltage criteria for LVH (left ventricular hypertrophy) 09/29/2018   • Abnormal findings on chest xray/CT (Irregular Left Ventricular Border, Right Lung Nodule, Right Middle Lobe nodule 09/29/2018   • Dyslipidemia 09/29/2018   • Rising PSA level 09/29/2018   • Vitamin D deficiency 09/29/2018   • Behind on immunizations & colonoscopy 09/29/2018   • Ascending aorta dilatation (HCC), mild 09/29/2018   • Actinic keratoses, including R pinna 09/29/2018   • Lipoma of torso 09/29/2018   • Pigmented skin lesion of uncertain nature, left scapula area 09/29/2018       History reviewed. No pertinent past medical history.    No current outpatient prescriptions on file.     No current facility-administered medications for this visit.        Allergies as of 10/16/2018   • (No Known Allergies)       Social History     Social History   • Marital status: Single     Spouse name: N/A   • Number of children: N/A   • Years of education: N/A     Occupational History   • Not on file.     Social History Main Topics   • Smoking status: Never Smoker   • Smokeless tobacco: Never Used   • Alcohol use 3.5 oz/week  "    7 Glasses of wine per week   • Drug use: No   • Sexual activity: Yes     Partners: Female     Other Topics Concern   • Not on file     Social History Narrative   • No narrative on file       Family History   Problem Relation Age of Onset   • Adopted: Yes   • Family history unknown: Yes       Past Surgical History:   Procedure Laterality Date   • COLONOSCOPY WITH BIOPSY      repeat 10 years       ROS: As per HPI. Additional pertinent symptoms as noted below.  Review of Systems   Constitutional: Negative for fever, chills, weight loss, malaise/fatigue and diaphoresis.   HENT: Negative for ear discharge, ear pain, hearing loss and tinnitus.    Eyes: Negative for blurred vision, double vision, pain, discharge and redness.   Respiratory: Negative for cough, hemoptysis, sputum production, shortness of breath and wheezing.    Cardiovascular: Negative for chest pain, palpitations, orthopnea, leg swelling and PND.   Gastrointestinal: Negative for heartburn, nausea, vomiting, abdominal pain, diarrhea, constipation and blood in stool.   Genitourinary: Negative for dysuria, urgency, frequency, hematuria and flank pain.   Musculoskeletal: Negative for myalgias, back pain, joint pain and neck pain.   Skin: Negative for itching and rash.   Neurological: Negative for dizziness, tingling, tremors, sensory change, focal weakness, loss of consciousness, weakness and headaches.   Psychiatric/Behavioral: Negative for depression and suicidal ideas. The patient is not nervous/anxious and does not have insomnia.        /64 (BP Location: Left arm, Patient Position: Sitting, BP Cuff Size: Adult)   Pulse 64   Temp 36.4 °C (97.5 °F) (Temporal)   Resp 20   Ht 1.803 m (5' 11\")   Wt 70.6 kg (155 lb 9.6 oz)   SpO2 98%   BMI 21.70 kg/m²     Physical Exam  General:  Alert and oriented, No apparent distress.    Eyes: Pupils equal and reactive. No scleral icterus.    Throat: Clear no erythema or exudates noted.    Neck: Supple. No " lymphadenopathy noted. Thyroid not enlarged.    Lungs: Clear to auscultation and percussion bilaterally.    Cardiovascular: Regular rate and rhythm. No murmurs, rubs or gallops.    Abdomen:  Benign. No rebound or guarding noted.    Extremities: No clubbing, cyanosis, edema.    Skin: Clear. No rash or suspicious skin lesions noted.        Assessment and Plan    Need for vaccination  - Patient needs flu and Tdap vaccines.  - ordered today.       Encounter to establish care  - Colon cancer colonoscopy screening: Had a normal colonoscopy when he was 50 years old, 2nd colonoscopy scheduled in [place].  - Prostate cancer screening: PSA 2.4 in September 2018, discussed with the patient, patient denies any symptoms.   - Lung cancer screening: non smoker no screening indicated  - AAA screening:   - Osteoporosis screening: not indicated   - HIV screening: done 15 years ago, use to donate blood.  - flu vaccine: order today.   - Tdap: order today  - PNA vac: not indicated   - Basic Labs within the last 12 months: Under general labs done 1 month ago including NMR lipid profile, ASCVD 6% and calcium scoring in the 25th percentile, no statin is indicated at this time.   - Vit D: done , we'll recheck vitamin D in 6 months.  - Shingles vac: recommended to patient.        Lung nodule  -  Chest x-ray done in September 2018 showed right upper lungs on intermediate nodule.  - denies cough, hemoptysis, shortness of breath and weight loss.  - Order chest CT scan.  - Follow-up after CT scan results.      Vitamin D insufficiency  -  Lab work done in September 2018 showed vitamin D of 28.  - currently taking over-the-counter vitamin D 2000 international units daily.          Followup: Return in about 6 months (around 4/16/2019).      Signed by: Boone Yeung M.D.

## 2018-10-16 NOTE — ASSESSMENT & PLAN NOTE
- Colon cancer colonoscopy screening: Had a normal colonoscopy when he was 50 years old, 2nd colonoscopy scheduled in [place].  - Prostate cancer screening: PSA 2.4 in September 2018, discussed with the patient, patient denies any symptoms.   - Lung cancer screening: non smoker no screening indicated  - AAA screening:   - Osteoporosis screening: not indicated   - HIV screening: done 15 years ago, use to donate blood.  - flu vaccine: order today.   - Tdap: order today  - PNA vac: not indicated   - Basic Labs within the last 12 months: Under general labs done 1 month ago including NMR lipid profile, ASCVD 6% and calcium scoring in the 25th percentile, no statin is indicated at this time.   - Vit D: done , we'll recheck vitamin D in 6 months.  - Shingles vac: recommended to patient.

## 2018-10-16 NOTE — ASSESSMENT & PLAN NOTE
-  Chest x-ray done in September 2018 showed right upper lungs on intermediate nodule.  - denies cough, hemoptysis, shortness of breath and weight loss.  - Order chest CT scan.  - Follow-up after CT scan results.

## 2018-10-16 NOTE — ASSESSMENT & PLAN NOTE
-  Lab work done in September 2018 showed vitamin D of 28.  - currently taking over-the-counter vitamin D 2000 international units daily.

## 2018-12-04 ENCOUNTER — HOSPITAL ENCOUNTER (OUTPATIENT)
Dept: RADIOLOGY | Facility: MEDICAL CENTER | Age: 63
End: 2018-12-04
Attending: STUDENT IN AN ORGANIZED HEALTH CARE EDUCATION/TRAINING PROGRAM
Payer: COMMERCIAL

## 2018-12-04 DIAGNOSIS — R91.1 LUNG NODULE: ICD-10-CM

## 2018-12-04 PROCEDURE — 71250 CT THORAX DX C-: CPT

## 2018-12-07 ENCOUNTER — OFFICE VISIT (OUTPATIENT)
Dept: INTERNAL MEDICINE | Facility: MEDICAL CENTER | Age: 63
End: 2018-12-07
Payer: COMMERCIAL

## 2018-12-07 VITALS
BODY MASS INDEX: 21.84 KG/M2 | TEMPERATURE: 97.6 F | HEIGHT: 71 IN | SYSTOLIC BLOOD PRESSURE: 127 MMHG | HEART RATE: 70 BPM | WEIGHT: 156 LBS | OXYGEN SATURATION: 95 % | DIASTOLIC BLOOD PRESSURE: 72 MMHG

## 2018-12-07 DIAGNOSIS — R91.1 LUNG NODULE: ICD-10-CM

## 2018-12-07 PROCEDURE — 99213 OFFICE O/P EST LOW 20 MIN: CPT | Mod: GE | Performed by: HOSPITALIST

## 2018-12-07 ASSESSMENT — PATIENT HEALTH QUESTIONNAIRE - PHQ9: CLINICAL INTERPRETATION OF PHQ2 SCORE: 0

## 2018-12-07 NOTE — PROGRESS NOTES
"      Established Patient    Junior presents today with the following:    CC: Follow-up visit CT chest  results     HPI:   The patient is a 63-year-old male with past medical history of dyslipidemia, vitamin D deficiency presented to the clinic for follow-up visit.  -Patient reports he had CT chest without contrast ordered by his PCP for a suspicious lung nodule noted on chest x-ray.  Denies any acute complaints.  Denies any recent intercurrent illnesses or ER visits.  Denies any episodes of pneumonia or lung disease in the past.  -Patient is adopted and does not know his family history.  -Denies tobacco use or alcohol use or illicit drug use.  Smokes marijuana occasionally for recreational purposes.    Patient Active Problem List    Diagnosis Date Noted   • Need for vaccination 10/16/2018   • Encounter to establish care 10/16/2018   • Lung nodule 10/16/2018   • Executive History and Physical Examination 09/29/2018   • Adopted person 09/29/2018   • ECG voltage criteria for LVH (left ventricular hypertrophy) 09/29/2018   • Abnormal findings on chest xray/CT (Irregular Left Ventricular Border, Right Lung Nodule, Right Middle Lobe nodule 09/29/2018   • Dyslipidemia 09/29/2018   • Rising PSA level 09/29/2018   • Vitamin D insufficiency 09/29/2018   • Behind on immunizations & colonoscopy 09/29/2018   • Ascending aorta dilatation (HCC), mild 09/29/2018   • Actinic keratoses, including R pinna 09/29/2018   • Lipoma of torso 09/29/2018   • Pigmented skin lesion of uncertain nature, left scapula area 09/29/2018       No current outpatient prescriptions on file.     No current facility-administered medications for this visit.        ROS: As per HPI. Additional pertinent systems as noted below.    All others negative    /72 (BP Location: Right arm, Patient Position: Sitting, BP Cuff Size: Adult)   Pulse 70   Temp 36.4 °C (97.6 °F) (Temporal)   Ht 1.803 m (5' 11\")   Wt 70.8 kg (156 lb)   SpO2 95%   BMI 21.76 kg/m²     "     Physical Exam   Constitutional:  oriented to person, place, and time. No distress.   Eyes: Pupils are equal, round, and reactive to light. No scleral icterus.  Neck: Neck supple. No thyromegaly present.   Cardiovascular: Normal rate, regular rhythm and normal heart sounds.  Exam reveals no gallop and no friction rub.  No murmur heard.  Pulmonary/Chest: Breath sounds normal. Chest wall is not dull to percussion.   Musculoskeletal:   no edema.   Lymphadenopathy: no cervical adenopathy  Neurological: alert and oriented to person, place, and time.   Skin: No cyanosis. Nails show no clubbing.    Note: I have reviewed all pertinent labs and diagnostic tests associated with this visit with specific comments listed under the assessment and plan below    Assessment and Plan    1. Lung nodule  -Patient presented to the clinic for follow up CT chest results.  -Chest x-ray done in September 2018 showed right upper lungs-intermittent nodule.  Follow-up CT chest without contrast showed multiple lung nodules-2.4 mm nodule right middle lobe, 2.2 mm nodule right middle lobe, 7.3 mm calcified nodule right upper lobe and scattered calcified granulomas within the left lung.  -Patient is referred to pulmonary for further evaluation.    Followup: Return in about 3 months (around 3/7/2019), or if symptoms worsen or fail to improve with PCP .      Signed by: Dolores Peralta M.D.

## 2019-01-14 ENCOUNTER — TELEPHONE (OUTPATIENT)
Dept: PULMONOLOGY | Facility: HOSPICE | Age: 64
End: 2019-01-14

## 2019-01-14 DIAGNOSIS — R06.02 SOB (SHORTNESS OF BREATH): ICD-10-CM

## 2019-01-15 ENCOUNTER — TELEPHONE (OUTPATIENT)
Dept: RADIOLOGY | Facility: IMAGING CENTER | Age: 64
End: 2019-01-15

## 2019-01-15 DIAGNOSIS — R93.89 ABNORMAL CHEST X-RAY: ICD-10-CM

## 2019-01-17 ENCOUNTER — APPOINTMENT (OUTPATIENT)
Dept: RADIOLOGY | Facility: IMAGING CENTER | Age: 64
End: 2019-01-17
Payer: COMMERCIAL

## 2019-01-17 ENCOUNTER — NON-PROVIDER VISIT (OUTPATIENT)
Dept: PULMONOLOGY | Facility: HOSPICE | Age: 64
End: 2019-01-17
Payer: COMMERCIAL

## 2019-01-17 ENCOUNTER — OFFICE VISIT (OUTPATIENT)
Dept: PULMONOLOGY | Facility: HOSPICE | Age: 64
End: 2019-01-17
Payer: COMMERCIAL

## 2019-01-17 ENCOUNTER — HOME STUDY (OUTPATIENT)
Dept: PULMONOLOGY | Facility: HOSPICE | Age: 64
End: 2019-01-17
Attending: INTERNAL MEDICINE
Payer: COMMERCIAL

## 2019-01-17 VITALS
BODY MASS INDEX: 21.56 KG/M2 | TEMPERATURE: 97.9 F | HEIGHT: 71 IN | HEART RATE: 80 BPM | RESPIRATION RATE: 16 BRPM | SYSTOLIC BLOOD PRESSURE: 108 MMHG | OXYGEN SATURATION: 99 % | DIASTOLIC BLOOD PRESSURE: 72 MMHG | WEIGHT: 154 LBS

## 2019-01-17 DIAGNOSIS — R06.02 SOB (SHORTNESS OF BREATH): ICD-10-CM

## 2019-01-17 DIAGNOSIS — G47.33 OBSTRUCTIVE SLEEP APNEA: ICD-10-CM

## 2019-01-17 DIAGNOSIS — R93.89 ABNORMAL CHEST X-RAY: ICD-10-CM

## 2019-01-17 DIAGNOSIS — Z20.1 TUBERCULOSIS EXPOSURE: ICD-10-CM

## 2019-01-17 DIAGNOSIS — R91.1 LUNG NODULE: ICD-10-CM

## 2019-01-17 DIAGNOSIS — R93.89 ABNORMAL FINDING ON CHEST XRAY: ICD-10-CM

## 2019-01-17 PROCEDURE — 94729 DIFFUSING CAPACITY: CPT | Performed by: INTERNAL MEDICINE

## 2019-01-17 PROCEDURE — 99204 OFFICE O/P NEW MOD 45 MIN: CPT | Mod: 25 | Performed by: INTERNAL MEDICINE

## 2019-01-17 PROCEDURE — 94762 N-INVAS EAR/PLS OXIMTRY CONT: CPT | Performed by: INTERNAL MEDICINE

## 2019-01-17 PROCEDURE — 94726 PLETHYSMOGRAPHY LUNG VOLUMES: CPT | Performed by: INTERNAL MEDICINE

## 2019-01-17 PROCEDURE — 94060 EVALUATION OF WHEEZING: CPT | Performed by: INTERNAL MEDICINE

## 2019-01-17 ASSESSMENT — PULMONARY FUNCTION TESTS
FEV1_PREDICTED: 3.6
FVC_PERCENT_PREDICTED: 100
FEV1/FVC_PERCENT_CHANGE: 200
FEV1_PERCENT_PREDICTED: 95
FEV1_LLN: 3.001
FEV1/FVC_PERCENT_PREDICTED: 97
FEV1/FVC_PERCENT_CHANGE: 3
FEV1/FVC_PERCENT_LLN: 63
FEV1: 3.43
FEV1_PERCENT_CHANGE: 3
FEV1/FVC: 73.2
FVC_PREDICTED: 4.81
FEV1/FVC: 71
FEV1/FVC_PERCENT_PREDICTED: 94
FEV1/FVC: 71
FEV1/FVC_PREDICTED: 75
FVC: 5
FVC_LLN: 4.02
FEV1: 3.66
FEV1_PERCENT_PREDICTED: 101
FVC: 4.84
FVC_PERCENT_PREDICTED: 103
FEV1/FVC_PERCENT_PREDICTED: 75
FEV1/FVC_PERCENT_PREDICTED: 98
FEV1_PERCENT_CHANGE: 6
FEV1/FVC_PERCENT_PREDICTED: 95
FEV1/FVC: 73

## 2019-01-17 ASSESSMENT — PAIN SCALES - GENERAL: PAINLEVEL: NO PAIN

## 2019-01-17 NOTE — PROGRESS NOTES
Junior Bran is a 64 y.o. male here for lung nodules and possible sleep apnea. Patient was referred by his primary care doctor.    History of Present Illness:      This gentleman comes in with his wife for evaluation of lung nodules.  They were discovered in September, a CAT scan in December was reviewed with the patient, he has very tiny nodules, 2 mm to 7 mm, one is calcified and I suspect these are granulomas.    Of note he has had a positive tuberculin skin test identified since he entered the [employer] at age 20.  No signs presently that would suggest active tuberculosis, and it is not clear if he ever received isoniazid prophylaxis.  At this point I would not offer it, given the long duration of the positive tuberculin skin test.  He does not smoke cigarettes, no significant inhalation exposures, no pattern to suggest asbestos but this will need surveillance at a six-month interval noncontrast CT and that is ordered, we will follow-up here in July.    The second issue was the concerned that he might need a procedure or biopsy, lung function testing was performed and does show very mild airflow obstruction with mild hyperinflation but excellent oxygen transfer.  He will not need a biopsy, and lung function looks good with regard to oxygen transfer.  This is consistent with his good physical shape and exercise with backpacking and hiking including [place] noted.    The next issue is that he does snore, his wife does describe periodic breathing, she has some insight having had sleep apnea herself and may need to resume evaluation for sleep apnea, I explained to her the concerns regarding cardiac strain and potential atrial fibrillation as secondary events.  We will screen this gentleman with overnight oximetry, if we see cluster desaturation then we would arrange for a home sleep study, probably auto CPAP given his good physical shape clear oropharynx and this is reviewed.  He may just have snoring without  desaturation as he has slender body build and oropharyngeal crowding is not evident.  His snoring may be related more to his nasal congestion and narrowing.    We will arrange for the CAT scan follow-up in July, overnight oximetry, my review of the CAT scan suggest these are tiny nodules, probably granulomas perhaps related to prior tuberculin exposure but could also be granulomatous from any other fungal cause, no biopsy needed at this time.  They are traveling around in a [place], sold their home, but have contact locally and can be seen for follow-up    Constitutional ROS: No unexpected change in weight, No unexplained fevers  Eyes: No change in vision or blurring or double vision  Mouth/Throat ROS: No sore throat, No recent change in voice or hoarseness  Pulmonary ROS: See present history for pertinent positives  Cardiovascular ROS: No chest pain to suggest acute coronary syndrome  Gastrointestinal ROS: No abdominal pain to suggest peptic disease  Musculoskeletal/Extremities ROS: no acute artritis or unusual swelling  Hematologic/Lymphatic ROS: No easy bleeding or unusual lymph node swelling  Neurologic ROS: No new or unusual weakness  Psychiatric ROS: No hallucinations  Allergic/Immunologic: No  urticaria or allergic rash      Current Outpatient Prescriptions   Medication Sig Dispense Refill   • Multiple Vitamin (MULTI-DAY VITAMINS PO) Take  by mouth.       No current facility-administered medications for this visit.        Social History   Substance Use Topics   • Smoking status: Never Smoker   • Smokeless tobacco: Never Used   • Alcohol use 3.5 oz/week     7 Glasses of wine per week        Past Medical History:   Diagnosis Date   • Chickenpox    • Pulmonary nodule    • Snoring        Past Surgical History:   Procedure Laterality Date   • COLONOSCOPY WITH BIOPSY      repeat 10 years       Allergies: Patient has no known allergies.    Family History   Problem Relation Age of Onset   • Adopted: Yes   • Family  "history unknown: Yes       Physical Examination    Vitals:    01/17/19 1007   Height: 1.803 m (5' 11\")   Weight: 69.9 kg (154 lb)   Weight % change since last entry.: 0 %   BP: 108/72   Pulse: 80   BMI (Calculated): 21.48   Resp: 16   Temp: 36.6 °C (97.9 °F)   TempSrc: Oral       General Appearance: alert, no distress  Skin: Skin color, texture, turgor normal. No rashes or lesions.  Eyes: negative  Oropharynx: Lips, mucosa, and tongue normal. Teeth and gums normal. Oropharynx moist and without lesion  Lungs: positive findings: Quiet and clear  Heart: negative. RRR without murmur, gallop, or rubs.  No ectopy.  Abdomen: Abdomen soft, non-tender. . No masses,  No organomegaly  Extremities:  No deformities, edema, or skin discoloration  Joints: No acute arthritis  Peripheral Pulses:perfused  Neurologic: intact grossly  No oropharyngeal crowding    I (soft palate, uvula, fauces, tonsillar pillars visible)    Imaging: CT scan reviewed with patient and wife regarding small nodules probably granulomas    PFTS: Borderline airflow obstruction with mild hyperinflation and normal oxygen transfer      Assessment and Plan  1. Lung nodule  - CT-CHEST (THORAX) W/O; Future    2. Obstructive sleep apnea  - Overnight Oximetry; Future    3. Abnormal findings on chest xray/CT (Irregular Left Ventricular Border, Right Lung Nodule, Right Middle Lobe nodule    4. 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  They were discovered in September, a CAT scan in December was reviewed with the patient, he has very tiny nodules, 2 mm to 7 mm, one is calcified and I suspect these are granulomas.    Of note he has had a positive tuberculin skin test identified since he entered the [employer] at age 20.  No signs presently that would suggest active tuberculosis, and it is not clear if he ever received isoniazid prophylaxis.  At this point I would not offer it, given the long " duration of the positive tuberculin skin test.  He does not smoke cigarettes, no significant inhalation exposures, no pattern to suggest asbestos but this will need surveillance at a six-month interval noncontrast CT and that is ordered, we will follow-up here in July.    The second issue was the concerned that he might need a procedure or biopsy, lung function testing was performed and does show very mild airflow obstruction with mild hyperinflation but excellent oxygen transfer.  He will not need a biopsy, and lung function looks good with regard to oxygen transfer.  This is consistent with his good physical shape and exercise with backpacking and hiking including [place] noted.    The next issue is that he does snore, his wife does describe periodic breathing, she has some insight having had sleep apnea herself and may need to resume evaluation for sleep apnea, I explained to her the concerns regarding cardiac strain and potential atrial fibrillation as secondary events.  We will screen this gentleman with overnight oximetry, if we see cluster desaturation then we would arrange for a home sleep study, probably auto CPAP given his good physical shape clear oropharynx and this is reviewed.  He may just have snoring without desaturation as he has slender body build and oropharyngeal crowding is not evident.  His snoring may be related more to his nasal congestion and narrowing.    We will arrange for the CAT scan follow-up in July, overnight oximetry, my review of the CAT scan suggest these are tiny nodules, probably granulomas perhaps related to prior tuberculin exposure but could also be granulomatous from any other fungal cause, no biopsy needed at this time.  They are traveling around in a [place], sold their home, but have contact locally and can be seen for follow-up  Followup Return in about 6 months (around 7/17/2019) for follow up visit with Dr. Alie Fields, after imaging test.

## 2019-01-17 NOTE — PATIENT INSTRUCTIONS
This gentleman comes in with his wife for evaluation of lung nodules.  They were discovered in September, a CAT scan in December was reviewed with the patient, he has very tiny nodules, 2 mm to 7 mm, one is calcified and I suspect these are granulomas.    Of note he has had a positive tuberculin skin test identified since he entered the [employer] at age 20.  No signs presently that would suggest active tuberculosis, and it is not clear if he ever received isoniazid prophylaxis.  At this point I would not offer it, given the long duration of the positive tuberculin skin test.  He does not smoke cigarettes, no significant inhalation exposures, no pattern to suggest asbestos but this will need surveillance at a six-month interval noncontrast CT and that is ordered, we will follow-up here in July.    The second issue was the concerned that he might need a procedure or biopsy, lung function testing was performed and does show very mild airflow obstruction with mild hyperinflation but excellent oxygen transfer.  He will not need a biopsy, and lung function looks good with regard to oxygen transfer.  This is consistent with his good physical shape and exercise with backpacking and hiking including [place] noted.    The next issue is that he does snore, his wife does describe periodic breathing, she has some insight having had sleep apnea herself and may need to resume evaluation for sleep apnea, I explained to her the concerns regarding cardiac strain and potential atrial fibrillation as secondary events.  We will screen this gentleman with overnight oximetry, if we see cluster desaturation then we would arrange for a home sleep study, probably auto CPAP given his good physical shape clear oropharynx and this is reviewed.  He may just have snoring without desaturation as he has slender body build and oropharyngeal crowding is not evident.  His snoring may be related more to his nasal congestion and narrowing.    We will  arrange for the CAT scan follow-up in July, overnight oximetry, my review of the CAT scan suggest these are tiny nodules, probably granulomas perhaps related to prior tuberculin exposure but could also be granulomatous from any other fungal cause, no biopsy needed at this time.  They are traveling around in a [place], sold their home, but have contact locally and can be seen for follow-up

## 2019-01-17 NOTE — PROCEDURES
Good patient effort & cooperation.  The results of this test meet the ATS/ERS standards for acceptability and repeatability.  Predicted equations for Spirometry are N-Renan II, ITS for lung volumes, and [facility] for DLCO.  The DLCO was uncorrected for Hgb.  A bronchodilator of Ventolin HFA- 2puffs via spacer were administered.  DLCO was performed during dilation period.    Lung function testing performed on January 17, 2019 showed very mild reduction of mid flows at 74% predicted.  FEV1 was normal and FEV1/FVC ratio was 94% predicted.  Moderate hyperinflation is noted with residual volume 175% predicted.  Oxygen transfer is normal.  Flow volume loop suggests a very mild obstructive pattern with coving, with excellent flows and good patient effort noted.

## 2019-01-21 NOTE — PROCEDURES
Overnight oximetry performed on room air.  The basal SPO2 is 93.3%.  There were desaturations below 88% for only 3.6 minutes in the first 2 hours of the study.    Interpretation:  Overall, normal oximetry on room air.

## 2019-01-22 ENCOUNTER — DOCUMENTATION (OUTPATIENT)
Dept: SLEEP MEDICINE | Facility: MEDICAL CENTER | Age: 64
End: 2019-01-22

## 2019-05-23 ENCOUNTER — PATIENT MESSAGE (OUTPATIENT)
Dept: PULMONOLOGY | Facility: HOSPICE | Age: 64
End: 2019-05-23

## 2019-05-24 ENCOUNTER — PATIENT MESSAGE (OUTPATIENT)
Dept: PULMONOLOGY | Facility: HOSPICE | Age: 64
End: 2019-05-24

## 2019-05-24 NOTE — TELEPHONE ENCOUNTER
From: Junior Bran  To: Alie Fields M.D.  Sent: 5/23/2019 12:54 PM PDT  Subject: Procedure Question    My calendar shows a follow up meeting on July 24th for something. I wanted to confirm what that was for. I also had a note for July 16th, no idea why, and it isn’t appearing in my chart. An you confirm next steps, please?

## 2019-05-24 NOTE — TELEPHONE ENCOUNTER
From: Junior Bran  To: Soraida Doshi Med Ass't  Sent: 5/24/2019 3:27 PM PDT  Subject: RE:(No subject)    Can you remind me what this appointment is for? My understanding was that I would be returning for a follow up X-ray or ct scan that the doctor would compare to earlier results. If no scan has been scheduled, what is there to discuss?  ----- Message -----  From: Soraida Doshi Med Ass't  Sent: 5/24/2019 3:16 PM PDT  To: Junior Bran  Subject: (No subject)  You have an apt with dr Fields 7/24 @9:30am. There is nothing scheduled for the 16th that I can see

## 2019-06-06 ENCOUNTER — OFFICE VISIT (OUTPATIENT)
Dept: URGENT CARE | Facility: PHYSICIAN GROUP | Age: 64
End: 2019-06-06
Payer: COMMERCIAL

## 2019-06-06 VITALS
WEIGHT: 156 LBS | TEMPERATURE: 97.8 F | SYSTOLIC BLOOD PRESSURE: 142 MMHG | HEART RATE: 68 BPM | OXYGEN SATURATION: 98 % | DIASTOLIC BLOOD PRESSURE: 90 MMHG | BODY MASS INDEX: 21.76 KG/M2 | RESPIRATION RATE: 16 BRPM

## 2019-06-06 DIAGNOSIS — Z48.02 VISIT FOR SUTURE REMOVAL: ICD-10-CM

## 2019-06-06 PROCEDURE — 99213 OFFICE O/P EST LOW 20 MIN: CPT | Performed by: FAMILY MEDICINE

## 2019-06-06 NOTE — PROGRESS NOTES
Subjective:   Junior Bran is a 64 y.o. male who presents for Suture / Staple Removal (Patient recieved 6 sutures at [facility] )     Suture / Staple Removal   The sutures were placed 7 to 10 days ago. He tried antibiotic ointment use since the wound repair. The treatment provided no relief. There has been no drainage from the wound. There is no redness present. There is no swelling present. The pain has improved.     ROS   Objective:   /90   Pulse 68   Temp 36.6 °C (97.8 °F) (Temporal)   Resp 16   Wt 70.8 kg (156 lb)   SpO2 98%   BMI 21.76 kg/m²   Physical Exam   Constitutional: He is oriented to person, place, and time. He appears well-developed and well-nourished. No distress.   HENT:   Head: Normocephalic and atraumatic.   Eyes: Pupils are equal, round, and reactive to light. Conjunctivae are normal.   Cardiovascular: Normal rate and regular rhythm.    Pulmonary/Chest: Effort normal and breath sounds normal.   Neurological: He is alert and oriented to person, place, and time.   Skin: Skin is warm and dry.        Psychiatric: He has a normal mood and affect. Thought content normal.   Vitals reviewed.       Assessment/Plan:   1. Visit for suture removal  6 sutures removed without complication. Patient tolerated well.  OTC ABx ointment 3 days per 's directions.   Differential diagnosis, natural history, supportive care, and indications for immediate follow-up discussed.

## 2019-07-24 ENCOUNTER — OFFICE VISIT (OUTPATIENT)
Dept: PULMONOLOGY | Facility: HOSPICE | Age: 64
End: 2019-07-24
Payer: COMMERCIAL

## 2019-07-24 VITALS
HEIGHT: 71 IN | HEART RATE: 68 BPM | DIASTOLIC BLOOD PRESSURE: 64 MMHG | SYSTOLIC BLOOD PRESSURE: 100 MMHG | OXYGEN SATURATION: 98 % | BODY MASS INDEX: 21.42 KG/M2 | WEIGHT: 153 LBS | RESPIRATION RATE: 14 BRPM | TEMPERATURE: 97.8 F

## 2019-07-24 DIAGNOSIS — R93.89 ABNORMAL FINDING ON CHEST XRAY: ICD-10-CM

## 2019-07-24 DIAGNOSIS — Z20.1 TUBERCULOSIS EXPOSURE: ICD-10-CM

## 2019-07-24 DIAGNOSIS — G47.33 OBSTRUCTIVE SLEEP APNEA: ICD-10-CM

## 2019-07-24 PROCEDURE — 99214 OFFICE O/P EST MOD 30 MIN: CPT | Performed by: INTERNAL MEDICINE

## 2019-07-24 RX ORDER — ERGOCALCIFEROL (VITAMIN D2) 50 MCG
CAPSULE ORAL DAILY
COMMUNITY

## 2019-07-24 ASSESSMENT — PAIN SCALES - GENERAL: PAINLEVEL: NO PAIN

## 2019-07-24 NOTE — PROGRESS NOTES
Junior Bran is a 64 y.o. male here for results of imaging with lung nodules, also suspicion of sleep apnea. Patient was referred by his primary care.    History of Present Illness:        This patient has been traveling in his [place], recently settled back in [place] to watch his grandchild's arrival, and comes in today to follow-up on his CT scan of the chest.  Results from [facility] indicate stable nodules, one is actually slightly better, certainly no progression in the appear to be granulomas.  He has no significant smoking history, stable nodules, no progression and we will do this once more at one year to complete the 2 years surveillance.        The second issue is that he remains health likes to hike but does have nighttime snoring, periodic breathing and our screening overnight oximetry showed significant clustered desaturation, although he remained above 90% the majority of the time the degree of impact certainly affects quality of his sleep.  This likely keeps him out of deep quality sleep, but also on the screening oximetry does show some cardiac irregularity.  We will do a home sleep study, determine whether or not he would be a candidate for auto CPAP, he is traveling again out of town over the next month but will return in September, we will reevaluate him at that time for the possibility of auto CPAP.    We will arrange for the follow-up CAT scan to complete the two-year surveillance when we see him back in several months  Constitutional ROS: No unexpected change in weight, No unexplained fevers  Eyes: No change in vision or blurring or double vision  Mouth/Throat ROS: No sore throat, No recent change in voice or hoarseness  Pulmonary ROS: See present history for pertinent positives  Cardiovascular ROS: No chest pain to suggest acute coronary syndrome  Gastrointestinal ROS: No abdominal pain to suggest peptic disease  Musculoskeletal/Extremities ROS: no acute artritis or unusual  "swelling  Hematologic/Lymphatic ROS: No easy bleeding or unusual lymph node swelling  Neurologic ROS: No new or unusual weakness  Psychiatric ROS: No hallucinations  Allergic/Immunologic: No  urticaria or allergic rash      Current Outpatient Prescriptions   Medication Sig Dispense Refill   • Vitamin D, Ergocalciferol, 2000 units Cap Take  by mouth.     • Calcium Carb-Cholecalciferol (CALCIUM 1000 + D PO) Take  by mouth.     • Multiple Vitamin (MULTI-DAY VITAMINS PO) Take  by mouth.       No current facility-administered medications for this visit.        Social History   Substance Use Topics   • Smoking status: Never Smoker   • Smokeless tobacco: Never Used   • Alcohol use 3.5 oz/week     7 Glasses of wine per week        Past Medical History:   Diagnosis Date   • Chickenpox    • Pulmonary nodule    • Snoring        Past Surgical History:   Procedure Laterality Date   • COLONOSCOPY WITH BIOPSY      repeat 10 years       Allergies: Patient has no allergy information on record.    Family History   Problem Relation Age of Onset   • Adopted: Yes   • Family history unknown: Yes       Physical Examination    Vitals:    07/24/19 0935   Height: 1.803 m (5' 11\")   Weight: 69.4 kg (153 lb)   Weight % change since last entry.: 0 %   BP: 100/64   Pulse: 68   BMI (Calculated): 21.34   Resp: 14   Temp: 36.6 °C (97.8 °F)   TempSrc: Oral       General Appearance: alert, no distress  Skin: Skin color, texture, turgor normal. No rashes or lesions.  Eyes: negative  Oropharynx: Lips, mucosa, and tongue normal. Teeth and gums normal. Oropharynx moist and without lesion  Lungs: positive findings: Quiet and clear  Heart: negative. RRR without murmur, gallop, or rubs.  No ectopy.  Abdomen: Abdomen soft, non-tender. . No masses,  No organomegaly  Extremities:  No deformities, edema, or skin discoloration  Joints: No acute arthritis  Peripheral Pulses:perfused  Neurologic: intact grossly  No unusual adenopathy    II (soft palate, uvula, " fauces visible)    Imaging: CT scan shows stability of multiple nodules, recheck in 1 year    PFTS: None today      Assessment and Plan  1. Abnormal findings on chest xray/CT (Irregular Left Ventricular Border, Right Lung Nodule, Right Middle Lobe nodule  Stable on recent CT imaging from [facility]    2. Tuberculosis exposure  Remote, no INH prophylaxis    3. Obstructive sleep apnea  Overnight oximetry demonstrates cluster desaturation, although mild has secondary cardiac irritability, sleep study scheduled and may benefit from home auto CPAP given his good body build slender and general good health  - Overnight Home Sleep Study; Future      Followup Return in about 3 months (around 10/24/2019) for follow up visit with Dr. Alie Fields, after sleep study.

## 2019-07-24 NOTE — PATIENT INSTRUCTIONS
This patient has been traveling in his [place], recently settled back in [place] to watch his grandchild's arrival, and comes in today to follow-up on his CT scan of the chest.  Results from [facility] indicate stable nodules, one is actually slightly better, certainly no progression in the appear to be granulomas.  He has no significant smoking history, stable nodules, no progression and we will do this once more at one year to complete the 2 years surveillance.        The second issue is that he remains health likes to hike but does have nighttime snoring, periodic breathing and our screening overnight oximetry showed significant clustered desaturation, although he remained above 90% the majority of the time the degree of impact certainly affects quality of his sleep.  This likely keeps him out of deep quality sleep, but also on the screening oximetry does show some cardiac irregularity.  We will do a home sleep study, determine whether or not he would be a candidate for auto CPAP, he is traveling again out of town over the next month but will return in September, we will reevaluate him at that time for the possibility of auto CPAP.    We will arrange for the follow-up CAT scan to complete the two-year surveillance when we see him back in several months

## 2019-11-04 ENCOUNTER — HOME STUDY (OUTPATIENT)
Dept: SLEEP MEDICINE | Facility: MEDICAL CENTER | Age: 64
End: 2019-11-04
Attending: INTERNAL MEDICINE
Payer: COMMERCIAL

## 2019-11-04 DIAGNOSIS — G47.33 OBSTRUCTIVE SLEEP APNEA: ICD-10-CM

## 2019-11-04 PROCEDURE — 95806 SLEEP STUDY UNATT&RESP EFFT: CPT | Performed by: FAMILY MEDICINE

## 2019-11-06 NOTE — PROCEDURES
DIAGNOSTIC HOME SLEEP TEST (HST) REPORT       PATIENT ID:  NAME:  Junior Bran  MRN:               [id number]  YOB: 1955  DATE OF STUDY: 11/4/19      Impression:     This study shows evidence of:     1.Moderate obstructive sleep apnea with  Respiratory Event Index (ETHAN) of 16.6 per hour and worse in supine sleep with ETHAN at 30. These findings are based on the recording time (flow evaluation time). It is not possible with this device to determine a traditional apnea+hypopnea index (AHI) for total sleep time since EEG channels are not available.     2.  O2 Sat. yesica was 76%, avg O2 was 93 % and baseline O2 at 99 %. O2 sat was below 89% for 27 min of the flow evaluation time. Avg HR 48 BPM.      TECHNICAL DESCRIPTION:  [employer] Device used was a type-III home study device. Home sleep study recording included: Airflow recording by nasal pressure transducer; Respiratory Effort by abdominal Respiratory Bands; O2 by finger oximetry. A position sensor and a snore channel was also used.    Scoring Criteria: A modification of the the AASM Manual for the Scoring of Sleep and Associated Events, 2012, was used.   Obstructive apnea was scored by cessation of airflow for at least 10 seconds with continuing respiratory effort.  Central apnea was scored by cessation of airflow for at least 10 seconds with no effort.  Hypopnea was scored by a 30% or more reduction in airflow for at least 10 seconds accompanied by an arterial oxygen desaturation of 3% or more.  (For Medicare patients, hypopneas were scored by a 30% or more reduction in airflow for at least 10 seconds accompanied by an arterial oxygen saturation of 4% or more, as required by their insurance, CMS.        General sleep summary: . Total recording time is 568 minutes and total flow evaluation time is 560 minutes. The patient spent 259 minutes in the supine position.  Respiratory events:    Apneas: 47 (Obstructive apnea index 5/hr, Central apnea  index 0 /hr, mixed 0 /hour)  Hypopneas: 108    Recommendations:    1. CPAP titration study vs Auto CPAP trial .   2.   In general patients with sleep apnea are advised to avoid alcohol and sedatives and to not operate a motor vehicle while drowsy. In some cases alternative treatment options may prove effective in resolving sleep apnea in these options include upper airway surgery, the use of a dental orthotic or weight loss and positional therapy. Clinical correlation is required.         Jocelin Stone MD

## 2019-11-27 ENCOUNTER — OFFICE VISIT (OUTPATIENT)
Dept: PULMONOLOGY | Facility: HOSPICE | Age: 64
End: 2019-11-27
Payer: COMMERCIAL

## 2019-11-27 VITALS
HEIGHT: 71 IN | BODY MASS INDEX: 21.44 KG/M2 | SYSTOLIC BLOOD PRESSURE: 110 MMHG | WEIGHT: 153.13 LBS | DIASTOLIC BLOOD PRESSURE: 76 MMHG | HEART RATE: 58 BPM | OXYGEN SATURATION: 97 %

## 2019-11-27 DIAGNOSIS — R91.1 LUNG NODULE: ICD-10-CM

## 2019-11-27 DIAGNOSIS — G47.33 OBSTRUCTIVE SLEEP APNEA: ICD-10-CM

## 2019-11-27 PROCEDURE — 99214 OFFICE O/P EST MOD 30 MIN: CPT | Performed by: INTERNAL MEDICINE

## 2019-11-27 NOTE — PATIENT INSTRUCTIONS
Junior comes in today again with his wife, the Karla is now in [place], but they spent a night in town to achieve the home sleep study.  It did show an index of 16, when he is supine it is actually 30, and we will be initiating auto CPAP.  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  That of course will be done without the CPAP device.  He does not have significant oral pharyngeal crowding that would be reversible surgically, and jaw structure is good.    We will initiate auto CPAP, range 8-15, and we will need to sort out his insurance reimbursement for that device given the fact that he will be on Medicare beginning in January we will see him back in several months

## 2019-11-27 NOTE — PROGRESS NOTES
Junior Bran is a 64 y.o. male here for results of home sleep study, also underlying lung lung nodule. Patient was referred by primary car    .    History of Present Illness:      Junior comes in today again with his wife, the Karla is now in [place], but they spent a night in town to achieve the home sleep study.  It did show an index of 16, when he is supine it is actually 30, and we will be initiating auto CPAP.  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  That of course will be done without the CPAP device.  He does not have significant oral pharyngeal crowding that would be reversible surgically, and jaw structure is good.    We will initiate auto CPAP, range 8-15, and we will need to sort out his insurance reimbursement for that device given the fact that he will be on Medicare beginning in January we will see him back in several months    Constitutional ROS: No unexpected change in weight, No unexplained fevers  Eyes: No change in vision or blurring or double vision  Mouth/Throat ROS: No sore throat, No recent change in voice or hoarseness  Pulmonary ROS: See present history for pertinent positives  Cardiovascular ROS: No chest pain to suggest acute coronary syndrome  Gastrointestinal ROS: No abdominal pain to suggest peptic disease  Musculoskeletal/Extremities ROS: no acute artritis or unusual swelling  Hematologic/Lymphatic ROS: No easy bleeding or unusual lymph node swelling  Neurologic ROS: No new or unusual weakness  Psychiatric ROS: No hallucinations  Allergic/Immunologic: No  urticaria or allergic rash      Current Outpatient Medications   Medication Sig Dispense Refill   • Nutritional Supplements (JUICE PLUS FIBRE PO) Take  by mouth every day. Fruit/veg supplement     • Probiotic Product (PROBIOTIC PO) Take  by mouth every day.     • Vitamin D, Ergocalciferol, 2000 units  "Cap Take  by mouth every day.     • Calcium Carb-Cholecalciferol (CALCIUM 1000 + D PO) Take  by mouth every day.     • Multiple Vitamin (MULTI-DAY VITAMINS PO) Take  by mouth every day.       No current facility-administered medications for this visit.        Social History     Tobacco Use   • Smoking status: Never Smoker   • Smokeless tobacco: Never Used   Substance Use Topics   • Alcohol use: Yes     Alcohol/week: 3.5 oz     Types: 7 Glasses of wine per week   • Drug use: No        Past Medical History:   Diagnosis Date   • Chickenpox    • Pulmonary nodule    • Snoring        Past Surgical History:   Procedure Laterality Date   • COLONOSCOPY WITH BIOPSY      repeat 10 years       Allergies: Patient has no known allergies.    Family History   Adopted: Yes   Family history unknown: Yes       Physical Examination    Vitals:    11/27/19 1516   Height: 1.803 m (5' 11\")   Weight: 69.5 kg (153 lb 2 oz)   Weight % change since last entry.: 0 %   BP: 110/76   Pulse: (!) 58   BMI (Calculated): 21.36       General Appearance: alert, no distress  Skin: Skin color, texture, turgor normal. No rashes or lesions.  Eyes: negative  Oropharynx: Lips, mucosa, and tongue normal. Teeth and gums normal. Oropharynx moist and without lesion  Lungs: positive findings: Quiet and clear  Heart: negative. RRR without murmur, gallop, or rubs.  No ectopy.  Abdomen: Abdomen soft, non-tender. . No masses,  No organomegaly  Extremities:  No deformities, edema, or skin discoloration  Joints: No acute arthritis  Peripheral Pulses:perfused  Neurologic: intact grossly  No cervical adenopathyII (soft palate, uvula, fauces visible)    II (soft palate, uvula, fauces visible)    Imaging: Will need follow-up CAT scan once more in July of next year    PFTS: None needed      Assessment and Plan  1. Obstructive sleep apnea  Initiate auto CPAP  - DME CPAP    2. Lung nodule  Arrange for follow-up CAT scan at the follow-up visit in early spring when we look at " his download and compliance data      Followup Return in about 3 months (around 2/27/2020) for follow up visit with Dr. Alie Fields.

## 2019-12-04 ENCOUNTER — PATIENT MESSAGE (OUTPATIENT)
Dept: PULMONOLOGY | Facility: HOSPICE | Age: 64
End: 2019-12-04

## 2019-12-04 NOTE — PATIENT COMMUNICATION
Actually it does   If pt's current insurance - [facility] - is aware his plan will term at the end of DEC - they will not approve the CPAP because this is a rent to own product.  The first month is always the most expensive bill to the insurance because it includes the 1 time humidifier charge and all the needed set up supplies.  [facility] is aware of this and will not pay for the first month of something that will not be converted to purchase fully

## 2019-12-04 NOTE — TELEPHONE ENCOUNTER
From: Junior Bran  To: Alie Fields M.D.  Sent: 12/4/2019 8:59 AM PST  Subject: Non-Urgent Medical Question    The facility filling the CPAP order has indicated I must wait until January to request product fulfillment due to changing insurance carriers at the end of the year.

## 2020-01-20 ENCOUNTER — PATIENT MESSAGE (OUTPATIENT)
Dept: PULMONOLOGY | Facility: HOSPICE | Age: 65
End: 2020-01-20

## 2020-01-20 NOTE — TELEPHONE ENCOUNTER
From: Junior Bran  To: Alie Fields M.D.  Sent: 1/20/2020 7:57 AM PST  Subject: Non-Urgent Medical Question    Is it safe to enter my Medicare coverage information into [employer]? I view it asan email system rather than as medical records, but I need to provide this info.

## 2020-01-23 ENCOUNTER — PATIENT MESSAGE (OUTPATIENT)
Dept: PULMONOLOGY | Facility: HOSPICE | Age: 65
End: 2020-01-23

## 2020-01-23 NOTE — TELEPHONE ENCOUNTER
From: Junior Bran  To: Alie Fields M.D.  Sent: 1/23/2020 7:41 AM PST  Subject: Non-Urgent Medical Question    The second shingrix vaccination has been completed.

## 2020-02-27 NOTE — PATIENT COMMUNICATION
Pt is scheduled for 3/14/2020 and [facility] machine [facility]  Updated appt notes for what is needed    Documentation that pt still has symptoms of PRISCILLA and still need treatment   New order needs to be placed  Send new order, new notes, SS and notes prior to DME of pt's choice with [facility]

## 2020-03-02 ENCOUNTER — PATIENT MESSAGE (OUTPATIENT)
Dept: PULMONOLOGY | Facility: HOSPICE | Age: 65
End: 2020-03-02

## 2020-03-03 NOTE — TELEPHONE ENCOUNTER
From: Junior Bran  To: Alie Fields M.D.  Sent: 3/2/2020 4:31 PM PST  Subject: Non-Urgent Medical Question    I need the team to move the March 16th appointment out to end of October, as March will no longer work. Here's why...    We are traveling in our RV and are currently in [place], [place]. This is a 7 hour drive to [place]. We often drive up for appointments if needed.     On March 20th, we will be relocating to [place] to attend a multi day event. At that point we are no longer close enough to drive up.     On April 3rd we will be relocating to [place], [place] for  maintenance. Still too far away to drive up.     As mentioned in Dr. Fields's notes, I will be starting to hike the [place] on April 15th. At that point I will be wholly unavailable.    I had hoped to complete this CPAP acquisition in the Jan/ Feb time frame but it didn't happen, and now I am out of time. Even if we determine a way to acquire the equipment on short notice, I would not be able to use it while out backpacking the trail for 6 months. So it seems foolish to rush this and not use it. It would be far better to reschedule the purchase to later in the year.     If this requires a consultation with Dr. Fields, please call. Otherwise, let me know what date to target in The last week of October.     By the way, there was going to be an annual X-ray checkup of the chest scar that could also be targeted in October.     Regards, Junior VENEGAS

## 2020-05-05 ENCOUNTER — PATIENT MESSAGE (OUTPATIENT)
Dept: PULMONOLOGY | Facility: HOSPICE | Age: 65
End: 2020-05-05

## 2020-05-05 NOTE — TELEPHONE ENCOUNTER
From: Junior Bran  To: Alie Fields M.D.  Sent: 5/5/2020 10:37 AM PDT  Subject: Non-Urgent Medical Question    Can someone spell out what would be involved in resurrecting my CPAP order?    Can appointments be made yet? How many appointments to completion? I wanted to be aware of and plan for the steps to complete this later this year.

## 2020-05-06 NOTE — PATIENT COMMUNICATION
Spoke to pt and offered him an appt this week but he is still out of the area  He is more than willing to repeat the HSS if needed so I went ahead and scheduled him with Dr. Fields in July   Updated OV notes to document what is needed

## 2020-07-28 ENCOUNTER — PATIENT MESSAGE (OUTPATIENT)
Dept: PULMONOLOGY | Facility: HOSPICE | Age: 65
End: 2020-07-28

## 2020-07-28 NOTE — TELEPHONE ENCOUNTER
From: Junior Bran  To: Alie Fields M.D.  Sent: 7/28/2020 1:30 PM PDT  Subject: Procedure Question    I have a question about the August 5th appointment. This is to restart the process for acquiring a CPAP machine, right? I assume this appointment would arrange for a sleep study of some sort - I had two different sleep studies previously. Will the cpap equipment be receiveable after the sleep study or are additional steps necessary?    I am trying to understand the process and number of visits required to complete this activity. I was unaware of the complexity when we began this the last time. I am asking because we no longer live in [place], and must drive long distances to get here. If we can consolidate appointments that would be most helpful. Thank you for your help!

## 2020-07-28 NOTE — TELEPHONE ENCOUNTER
From: Junior Bran  To: Alie Fields M.D.  Sent: 7/28/2020 3:27 PM PDT  Subject: Procedure Question    The cpap order was not completed because I changed over to Medicare in January, and Jacquelyn said I had to start over. I am trying to understand how much gets repeated and how long it takes?

## 2020-07-29 ENCOUNTER — APPOINTMENT (OUTPATIENT)
Dept: PULMONOLOGY | Facility: HOSPICE | Age: 65
End: 2020-07-29
Payer: MEDICARE

## 2020-07-29 NOTE — PATIENT COMMUNICATION
Pt was never set up and the CONSULT prior the original Home Sleep study has  - Per MCDR guidelines consult, sleep study and set up must happen within 12 month window.  Since the consult has , the testing has technically  as well    Pt will need new consult and new HSS or inlab testing and then CPAP can be reordered    Pt will also need a compliance OV within the window of 30-90 days after set up

## 2020-08-03 NOTE — PATIENT COMMUNICATION
Pt cancelled appt that was scheduled 8/5/2020        Cancel Rsn: Other (Forgive me, but I am going to cancel this appointment. I will establish with a local pulmonary in [place] later this year and restart the process. Thank you for all that you have helped with. )

## 2021-02-28 ENCOUNTER — PATIENT MESSAGE (OUTPATIENT)
Dept: SLEEP MEDICINE | Facility: MEDICAL CENTER | Age: 66
End: 2021-02-28

## 2021-03-01 ENCOUNTER — PATIENT MESSAGE (OUTPATIENT)
Dept: SLEEP MEDICINE | Facility: MEDICAL CENTER | Age: 66
End: 2021-03-01

## 2021-03-01 NOTE — TELEPHONE ENCOUNTER
From: Junior Bran  To: Physician Alie Fields  Sent: 2/28/2021 11:54 AM PST  Subject: Non-Urgent Medical Question    Just letting you know that I did receive my CPAP machine thru my local resources in [place]. I have been using it for a month now.

## 2021-03-01 NOTE — TELEPHONE ENCOUNTER
From: Junior Bran  To: Physician Alie Fields  Sent: 3/1/2021 9:21 AM PST  Subject: Non-Urgent Medical Question    The unit is a Philip dream station, which was supplied by [employer] in [place]. My follow up appointment has been scheduled with the local PCP in [place].      ----- Message -----   From:Medical Assistant Ellen MCCURDY   Sent:3/1/2021 9:16 AM PST   To:Junior Bran   Subject:RE: Non-Urgent Medical Question    Good Morning Junior,  Which [employer] company supplied your CPAP, so we can update your chart?  Also, please call [phone] to schedule an appointment to follow up.  Thanks,  Ellen       ----- Message -----   From:Junior Bran   Sent:2/28/2021 11:54 AM PST   To:Physician Alie Fields   Subject:Non-Urgent Medical Question    Just letting you know that I did receive my CPAP machine thru my local resources in [place]. I have been using it for a month now.